# Patient Record
Sex: FEMALE | Race: BLACK OR AFRICAN AMERICAN | NOT HISPANIC OR LATINO | URBAN - METROPOLITAN AREA
[De-identification: names, ages, dates, MRNs, and addresses within clinical notes are randomized per-mention and may not be internally consistent; named-entity substitution may affect disease eponyms.]

---

## 2024-04-18 ENCOUNTER — OFFICE VISIT (OUTPATIENT)
Dept: FAMILY MEDICINE | Facility: CLINIC | Age: 29
End: 2024-04-18
Payer: MEDICAID

## 2024-04-18 VITALS
WEIGHT: 192 LBS | SYSTOLIC BLOOD PRESSURE: 115 MMHG | TEMPERATURE: 98 F | OXYGEN SATURATION: 99 % | DIASTOLIC BLOOD PRESSURE: 74 MMHG | BODY MASS INDEX: 29.1 KG/M2 | HEIGHT: 68 IN | HEART RATE: 71 BPM | RESPIRATION RATE: 18 BRPM

## 2024-04-18 DIAGNOSIS — Z00.00 ENCOUNTER FOR WELLNESS EXAMINATION: ICD-10-CM

## 2024-04-18 DIAGNOSIS — N93.9 ABNORMAL UTERINE BLEEDING: Primary | ICD-10-CM

## 2024-04-18 LAB
ALBUMIN SERPL-MCNC: 4 G/DL (ref 3.5–5)
ALBUMIN/GLOB SERPL: 1 RATIO (ref 1.1–2)
ALP SERPL-CCNC: 70 UNIT/L (ref 40–150)
ALT SERPL-CCNC: 29 UNIT/L (ref 0–55)
AMORPH URATE CRY URNS QL MICRO: ABNORMAL /UL
APPEARANCE UR: ABNORMAL
AST SERPL-CCNC: 15 UNIT/L (ref 5–34)
BACTERIA #/AREA URNS AUTO: ABNORMAL /HPF
BASOPHILS # BLD AUTO: 0.07 X10(3)/MCL
BASOPHILS NFR BLD AUTO: 1.4 %
BILIRUB SERPL-MCNC: 0.3 MG/DL
BILIRUB UR QL STRIP.AUTO: NEGATIVE
BUN SERPL-MCNC: 13.3 MG/DL (ref 7–18.7)
CALCIUM SERPL-MCNC: 9.5 MG/DL (ref 8.4–10.2)
CHLORIDE SERPL-SCNC: 107 MMOL/L (ref 98–107)
CHOLEST SERPL-MCNC: 157 MG/DL
CHOLEST/HDLC SERPL: 3 {RATIO} (ref 0–5)
CO2 SERPL-SCNC: 25 MMOL/L (ref 22–29)
COLOR UR AUTO: ABNORMAL
CREAT SERPL-MCNC: 0.84 MG/DL (ref 0.55–1.02)
DEPRECATED CALCIDIOL+CALCIFEROL SERPL-MC: 21.2 NG/ML (ref 30–80)
EOSINOPHIL # BLD AUTO: 0.06 X10(3)/MCL (ref 0–0.9)
EOSINOPHIL NFR BLD AUTO: 1.2 %
ERYTHROCYTE [DISTWIDTH] IN BLOOD BY AUTOMATED COUNT: 11.3 % (ref 11.5–17)
EST. AVERAGE GLUCOSE BLD GHB EST-MCNC: 96.8 MG/DL
GFR SERPLBLD CREATININE-BSD FMLA CKD-EPI: >60 MLS/MIN/1.73/M2
GLOBULIN SER-MCNC: 3.9 GM/DL (ref 2.4–3.5)
GLUCOSE SERPL-MCNC: 83 MG/DL (ref 74–100)
GLUCOSE UR QL STRIP.AUTO: NORMAL
HBA1C MFR BLD: 5 %
HCT VFR BLD AUTO: 39.4 % (ref 37–47)
HCV AB SERPL QL IA: NONREACTIVE
HDLC SERPL-MCNC: 56 MG/DL (ref 35–60)
HGB BLD-MCNC: 12.9 G/DL (ref 12–16)
HIV 1+2 AB+HIV1 P24 AG SERPL QL IA: NONREACTIVE
HYALINE CASTS #/AREA URNS LPF: ABNORMAL /LPF
IMM GRANULOCYTES # BLD AUTO: 0.01 X10(3)/MCL (ref 0–0.04)
IMM GRANULOCYTES NFR BLD AUTO: 0.2 %
KETONES UR QL STRIP.AUTO: NEGATIVE
LDLC SERPL CALC-MCNC: 71 MG/DL (ref 50–140)
LEUKOCYTE ESTERASE UR QL STRIP.AUTO: NEGATIVE
LYMPHOCYTES # BLD AUTO: 1.71 X10(3)/MCL (ref 0.6–4.6)
LYMPHOCYTES NFR BLD AUTO: 35.2 %
MCH RBC QN AUTO: 30.9 PG (ref 27–31)
MCHC RBC AUTO-ENTMCNC: 32.7 G/DL (ref 33–36)
MCV RBC AUTO: 94.3 FL (ref 80–94)
MONOCYTES # BLD AUTO: 0.45 X10(3)/MCL (ref 0.1–1.3)
MONOCYTES NFR BLD AUTO: 9.3 %
NEUTROPHILS # BLD AUTO: 2.56 X10(3)/MCL (ref 2.1–9.2)
NEUTROPHILS NFR BLD AUTO: 52.7 %
NITRITE UR QL STRIP.AUTO: NEGATIVE
NRBC BLD AUTO-RTO: 0 %
PH UR STRIP.AUTO: 5.5 [PH]
PLATELET # BLD AUTO: 322 X10(3)/MCL (ref 130–400)
PMV BLD AUTO: 10.1 FL (ref 7.4–10.4)
POTASSIUM SERPL-SCNC: 4 MMOL/L (ref 3.5–5.1)
PROT SERPL-MCNC: 7.9 GM/DL (ref 6.4–8.3)
PROT UR QL STRIP.AUTO: NEGATIVE
RBC # BLD AUTO: 4.18 X10(6)/MCL (ref 4.2–5.4)
RBC #/AREA URNS AUTO: ABNORMAL /HPF
RBC UR QL AUTO: NEGATIVE
SODIUM SERPL-SCNC: 139 MMOL/L (ref 136–145)
SP GR UR STRIP.AUTO: 1.03 (ref 1–1.03)
SQUAMOUS #/AREA URNS LPF: ABNORMAL /HPF
T PALLIDUM AB SER QL: NONREACTIVE
T4 FREE SERPL-MCNC: 0.77 NG/DL (ref 0.7–1.48)
TRIGL SERPL-MCNC: 150 MG/DL (ref 37–140)
TSH SERPL-ACNC: 0.69 UIU/ML (ref 0.35–4.94)
UROBILINOGEN UR STRIP-ACNC: NORMAL
VIT B12 SERPL-MCNC: 158 PG/ML (ref 213–816)
VLDLC SERPL CALC-MCNC: 30 MG/DL
WBC # SPEC AUTO: 4.86 X10(3)/MCL (ref 4.5–11.5)
WBC #/AREA URNS AUTO: ABNORMAL /HPF

## 2024-04-18 PROCEDURE — 84443 ASSAY THYROID STIM HORMONE: CPT | Performed by: NURSE PRACTITIONER

## 2024-04-18 PROCEDURE — 3078F DIAST BP <80 MM HG: CPT | Mod: CPTII,,, | Performed by: NURSE PRACTITIONER

## 2024-04-18 PROCEDURE — 84439 ASSAY OF FREE THYROXINE: CPT | Performed by: NURSE PRACTITIONER

## 2024-04-18 PROCEDURE — 99204 OFFICE O/P NEW MOD 45 MIN: CPT | Mod: S$PBB,,, | Performed by: NURSE PRACTITIONER

## 2024-04-18 PROCEDURE — 82306 VITAMIN D 25 HYDROXY: CPT | Performed by: NURSE PRACTITIONER

## 2024-04-18 PROCEDURE — 86803 HEPATITIS C AB TEST: CPT | Performed by: NURSE PRACTITIONER

## 2024-04-18 PROCEDURE — 1160F RVW MEDS BY RX/DR IN RCRD: CPT | Mod: CPTII,,, | Performed by: NURSE PRACTITIONER

## 2024-04-18 PROCEDURE — 81001 URINALYSIS AUTO W/SCOPE: CPT | Performed by: NURSE PRACTITIONER

## 2024-04-18 PROCEDURE — 83036 HEMOGLOBIN GLYCOSYLATED A1C: CPT | Performed by: NURSE PRACTITIONER

## 2024-04-18 PROCEDURE — 87389 HIV-1 AG W/HIV-1&-2 AB AG IA: CPT | Performed by: NURSE PRACTITIONER

## 2024-04-18 PROCEDURE — 82607 VITAMIN B-12: CPT | Performed by: NURSE PRACTITIONER

## 2024-04-18 PROCEDURE — 99203 OFFICE O/P NEW LOW 30 MIN: CPT | Mod: PBBFAC,PN | Performed by: NURSE PRACTITIONER

## 2024-04-18 PROCEDURE — 1159F MED LIST DOCD IN RCRD: CPT | Mod: CPTII,,, | Performed by: NURSE PRACTITIONER

## 2024-04-18 PROCEDURE — 36415 COLL VENOUS BLD VENIPUNCTURE: CPT | Performed by: NURSE PRACTITIONER

## 2024-04-18 PROCEDURE — 80053 COMPREHEN METABOLIC PANEL: CPT | Performed by: NURSE PRACTITIONER

## 2024-04-18 PROCEDURE — 80061 LIPID PANEL: CPT | Performed by: NURSE PRACTITIONER

## 2024-04-18 PROCEDURE — 85025 COMPLETE CBC W/AUTO DIFF WBC: CPT | Performed by: NURSE PRACTITIONER

## 2024-04-18 PROCEDURE — 86780 TREPONEMA PALLIDUM: CPT | Performed by: NURSE PRACTITIONER

## 2024-04-18 PROCEDURE — 3074F SYST BP LT 130 MM HG: CPT | Mod: CPTII,,, | Performed by: NURSE PRACTITIONER

## 2024-04-18 PROCEDURE — 3008F BODY MASS INDEX DOCD: CPT | Mod: CPTII,,, | Performed by: NURSE PRACTITIONER

## 2024-04-18 NOTE — PROGRESS NOTES
"Patient Name: Neha Ramirez     : 1995    MRN: [id number]     Subjective:     Patient ID: Neha Ramirez is a 28 y.o. female.    Chief Complaint:   Chief Complaint   Patient presents with    Establish Care     Pt is here to establish care with PCP. Pt was being seen by Dr Ashleigh Soliman in [place]. Pt request gyn referral for Mirena IUD removal d/t pain and heavy menses since placement        HPI: 24: Pt is here to establish care with PCP. Pt was being seen by Dr Ashleigh Soliman in [place]. Pt request gyn referral for Mirena IUD removal d/t pain and heavy menses since placement.         ROS:      Review of Systems   Constitutional: Negative.    HENT: Negative.     Eyes: Negative.    Respiratory: Negative.     Cardiovascular: Negative.    Gastrointestinal: Negative.    Genitourinary: Negative.    Musculoskeletal: Negative.    Skin: Negative.    Neurological: Negative.    Endo/Heme/Allergies: Negative.    Psychiatric/Behavioral: Negative.     All other systems reviewed and are negative.           History:     No past medical history on file.     No past surgical history on file.    Family History   Problem Relation Name Age of Onset    Hypertension Mother      No Known Problems Father          Social History     Tobacco Use    Smoking status: Never    Smokeless tobacco: Never   Substance and Sexual Activity    Alcohol use: Yes     Comment: socially    Drug use: Never    Sexual activity: Yes       No current outpatient medications     Review of patient's allergies indicates:  No Known Allergies    Objective:     Visit Vitals  /74 (BP Location: Left arm, Patient Position: Sitting, BP Method: Medium (Automatic))   Pulse 71   Temp 98.2 °F (36.8 °C) (Oral)   Resp 18   Ht 5' 8" (1.727 m)   Wt 87.1 kg (192 lb)   LMP 2024   SpO2 99%   BMI 29.19 kg/m²       Physical Examination:     Physical Exam  Constitutional:       General: She is awake.      Appearance: Normal appearance. She is well-developed. " "  HENT:      Head: Normocephalic and atraumatic.      Right Ear: Hearing, tympanic membrane, ear canal and external ear normal.      Left Ear: Hearing, tympanic membrane, ear canal and external ear normal.      Nose: Nose normal.      Mouth/Throat:      Lips: Pink.      Mouth: Mucous membranes are moist.      Pharynx: Oropharynx is clear. Uvula midline.   Eyes:      Pupils: Pupils are equal, round, and reactive to light.   Cardiovascular:      Rate and Rhythm: Normal rate and regular rhythm.      Pulses: Normal pulses.      Heart sounds: Normal heart sounds.   Pulmonary:      Effort: Pulmonary effort is normal.      Breath sounds: Normal breath sounds.   Abdominal:      General: Abdomen is flat. Bowel sounds are normal.      Palpations: Abdomen is soft.   Musculoskeletal:         General: Normal range of motion.      Cervical back: Normal range of motion and neck supple.   Skin:     General: Skin is warm and dry.      Capillary Refill: Capillary refill takes less than 2 seconds.   Neurological:      General: No focal deficit present.      Mental Status: She is alert, oriented to person, place, and time and easily aroused. Mental status is at baseline.   Psychiatric:         Mood and Affect: Mood normal.         Behavior: Behavior is cooperative.         Lab Results:     Chemistry:  No results found for: "NA", "K", "CHLORIDE", "BUN", "CREATININE", "EGFRNORACEVR", "GLUCOSE", "CALCIUM", "ALKPHOS", "LABPROT", "ALBUMIN", "BILIDIR", "IBILI", "AST", "ALT", "MG", "PHOS", "[id number]", "TSH", "[id number]", "PSA"     No results found for: "HGBA1C", "MICROALBCREA"     Hematology:  No results found for: "WBC", "HGB", "HCT", "PLT"    Lipid Panel:  No results found for: "CHOL", "HDL", "LDL", "TRIG", "TOTALCHOLEST"     Urine:  No results found for: "COLORUA", "APPEARANCEUA", "SGUA", "PHUA", "PROTEINUA", "GLUCOSEUA", "KETONESUA", "BLOODUA", "NITRITESUA", "LEUKOCYTESUR", "RBCUA", "WBCUA", "BACTERIA", "SQEPUA", "HYALINECASTS", " ""CREATRANDUR", "PROTEINURINE", "UPROTCREA"     Assessment:          ICD-10-CM ICD-9-CM   1. Abnormal uterine bleeding  N93.9 626.9   2. Encounter for wellness examination  Z00.00 V70.0        Plan:     1. Abnormal uterine bleeding  Assessment & Plan:  Referral to GYN at [facility] for removal of IUD-Mirena    Orders:  -     Ambulatory referral/consult to Gynecology; Future; Expected date: 04/25/2024    2. Encounter for wellness examination  -     Hepatitis C antibody  -     Lipid panel  -     HIV 1/2 Ag/Ab (4th Gen)  -     CBC Auto Differential  -     Comprehensive Metabolic Panel  -     Urinalysis  -     Hemoglobin A1C  -     TSH  -     T4, Free  -     Vitamin D  -     Vitamin B12  -     SYPHILIS ANTIBODY (WITH REFLEX RPR)         Follow up in about 1 month (around 5/18/2024) for Review of labs, Virtual Visit.    No future appointments.     DIGNA Yang        "

## 2024-04-23 ENCOUNTER — TELEPHONE (OUTPATIENT)
Dept: FAMILY MEDICINE | Facility: CLINIC | Age: 29
End: 2024-04-23
Payer: MEDICAID

## 2024-05-22 ENCOUNTER — OFFICE VISIT (OUTPATIENT)
Dept: FAMILY MEDICINE | Facility: CLINIC | Age: 29
End: 2024-05-22
Payer: MEDICAID

## 2024-05-22 VITALS
HEIGHT: 68 IN | SYSTOLIC BLOOD PRESSURE: 116 MMHG | DIASTOLIC BLOOD PRESSURE: 76 MMHG | BODY MASS INDEX: 28.19 KG/M2 | TEMPERATURE: 98 F | RESPIRATION RATE: 18 BRPM | OXYGEN SATURATION: 99 % | HEART RATE: 79 BPM | WEIGHT: 186 LBS

## 2024-05-22 DIAGNOSIS — E53.8 B12 DEFICIENCY: Primary | ICD-10-CM

## 2024-05-22 DIAGNOSIS — N93.9 ABNORMAL UTERINE BLEEDING: ICD-10-CM

## 2024-05-22 DIAGNOSIS — E55.9 VITAMIN D DEFICIENCY: ICD-10-CM

## 2024-05-22 LAB — FOLATE SERPL-MCNC: 11 NG/ML (ref 7–31.4)

## 2024-05-22 PROCEDURE — 1159F MED LIST DOCD IN RCRD: CPT | Mod: CPTII,,, | Performed by: NURSE PRACTITIONER

## 2024-05-22 PROCEDURE — 3008F BODY MASS INDEX DOCD: CPT | Mod: CPTII,,, | Performed by: NURSE PRACTITIONER

## 2024-05-22 PROCEDURE — 3044F HG A1C LEVEL LT 7.0%: CPT | Mod: CPTII,,, | Performed by: NURSE PRACTITIONER

## 2024-05-22 PROCEDURE — 3078F DIAST BP <80 MM HG: CPT | Mod: CPTII,,, | Performed by: NURSE PRACTITIONER

## 2024-05-22 PROCEDURE — 3074F SYST BP LT 130 MM HG: CPT | Mod: CPTII,,, | Performed by: NURSE PRACTITIONER

## 2024-05-22 PROCEDURE — 1160F RVW MEDS BY RX/DR IN RCRD: CPT | Mod: CPTII,,, | Performed by: NURSE PRACTITIONER

## 2024-05-22 PROCEDURE — 99214 OFFICE O/P EST MOD 30 MIN: CPT | Mod: S$PBB,,, | Performed by: NURSE PRACTITIONER

## 2024-05-22 PROCEDURE — 83921 ORGANIC ACID SINGLE QUANT: CPT | Performed by: NURSE PRACTITIONER

## 2024-05-22 PROCEDURE — 82746 ASSAY OF FOLIC ACID SERUM: CPT | Performed by: NURSE PRACTITIONER

## 2024-05-22 PROCEDURE — 36415 COLL VENOUS BLD VENIPUNCTURE: CPT | Performed by: NURSE PRACTITIONER

## 2024-05-22 PROCEDURE — 99214 OFFICE O/P EST MOD 30 MIN: CPT | Mod: PBBFAC,PN | Performed by: NURSE PRACTITIONER

## 2024-05-22 RX ORDER — LANOLIN ALCOHOL/MO/W.PET/CERES
1000 CREAM (GRAM) TOPICAL DAILY
Qty: 30 TABLET | Refills: 6 | Status: SHIPPED | OUTPATIENT
Start: 2024-05-22

## 2024-05-22 RX ORDER — ASPIRIN 325 MG
50000 TABLET, DELAYED RELEASE (ENTERIC COATED) ORAL
Qty: 12 CAPSULE | Refills: 0 | Status: SHIPPED | OUTPATIENT
Start: 2024-05-22

## 2024-05-22 NOTE — ASSESSMENT & PLAN NOTE
Initiate Oral B12 1000mcg daily  Vitamin Def panel with Folate, MMA, .Homocysteine  Pt states since IUD was placed, she has been having gastritis type symptoms of reflux, etc.  Denies hx gluten intolerance.  May consider celiac workup if sx continue despite removal of IUD in June.

## 2024-05-22 NOTE — PROGRESS NOTES
Patient Name: Neha Ramirez     : 1995    MRN: [id number]     Subjective:     Patient ID: Neha Ramirez is a 28 y.o. female.    Chief Complaint:   Chief Complaint   Patient presents with    Follow-up     Pt is here for follow up. Pt sees gyn 2024        HPI: 24: FU 1 month review of labs which showed b12 and vit d def.  Patient educated during visit on B12 deficiencies. Does report some GERD or gastritis type symptoms since the IUD was placed.  She is going to see GYN in  to have visit and then will be scheduled for removal.           24: Pt is here to establish care with PCP. Pt was being seen by Dr Ashleigh Soliman in [place]. Pt request gyn referral for Mirena IUD removal d/t pain and heavy menses since placement.             ROS:      Review of Systems   Constitutional: Negative.    HENT: Negative.     Eyes: Negative.    Respiratory: Negative.     Cardiovascular: Negative.    Gastrointestinal: Negative.    Genitourinary: Negative.    Musculoskeletal: Negative.    Skin: Negative.    Neurological: Negative.    Endo/Heme/Allergies: Negative.    Psychiatric/Behavioral: Negative.     All other systems reviewed and are negative.           History:     History reviewed. No pertinent past medical history.     History reviewed. No pertinent surgical history.    Family History   Problem Relation Name Age of Onset    Hypertension Mother      No Known Problems Father          Social History     Tobacco Use    Smoking status: Never    Smokeless tobacco: Never   Substance and Sexual Activity    Alcohol use: Yes     Comment: socially    Drug use: Never    Sexual activity: Yes       Current Outpatient Medications   Medication Instructions    cholecalciferol (vitamin D3) 50,000 Units, Oral, Every 7 days    cyanocobalamin (VITAMIN B-12) 1,000 mcg, Oral, Daily        Review of patient's allergies indicates:  No Known Allergies    Objective:     Visit Vitals  /76 (BP Location: Left arm, Patient  "Position: Sitting, BP Method: Large (Automatic))   Pulse 79   Temp 97.9 °F (36.6 °C) (Oral)   Resp 18   Ht 5' 8" (1.727 m)   Wt 84.4 kg (186 lb)   LMP 04/26/2024 (Approximate)   SpO2 99%   BMI 28.28 kg/m²       Physical Examination:     Physical Exam  Vitals and nursing note reviewed.   HENT:      Head: Normocephalic and atraumatic.   Cardiovascular:      Rate and Rhythm: Normal rate and regular rhythm.      Pulses: Normal pulses.      Heart sounds: Normal heart sounds.   Pulmonary:      Breath sounds: Normal breath sounds.   Musculoskeletal:         General: Normal range of motion.      Cervical back: Normal range of motion.   Skin:     General: Skin is warm and dry.   Neurological:      General: No focal deficit present.      Mental Status: She is alert and oriented to person, place, and time.   Psychiatric:         Mood and Affect: Mood normal.         Lab Results:     Chemistry:  Lab Results   Component Value Date     04/18/2024    K 4.0 04/18/2024    CHLORIDE 107 04/18/2024    BUN 13.3 04/18/2024    CREATININE 0.84 04/18/2024    EGFRNORACEVR >60 04/18/2024    GLUCOSE 83 04/18/2024    CALCIUM 9.5 04/18/2024    ALKPHOS 70 04/18/2024    LABPROT 7.9 04/18/2024    ALBUMIN 4.0 04/18/2024    AST 15 04/18/2024    ALT 29 04/18/2024    TXPIDRYP89QH 21.2 (L) 04/18/2024    TSH 0.685 04/18/2024    VVKXTO2MFDC 0.77 04/18/2024        Lab Results   Component Value Date    HGBA1C 5.0 04/18/2024        Hematology:  Lab Results   Component Value Date    WBC 4.86 04/18/2024    HGB 12.9 04/18/2024    HCT 39.4 04/18/2024     04/18/2024       Lipid Panel:  Lab Results   Component Value Date    CHOL 157 04/18/2024    HDL 56 04/18/2024    LDL 71.00 04/18/2024    TRIG 150 (H) 04/18/2024    TOTALCHOLEST 3 04/18/2024        Urine:  Lab Results   Component Value Date    COLORUA Light-Orange (A) 04/18/2024    APPEARANCEUA Cloudy (A) 04/18/2024    SGUA 1.028 04/18/2024    PHUA 5.5 04/18/2024    PROTEINUA Negative 04/18/2024    " GLUCOSEUA Normal 04/18/2024    KETONESUA Negative 04/18/2024    BLOODUA Negative 04/18/2024    NITRITESUA Negative 04/18/2024    LEUKOCYTESUR Negative 04/18/2024    RBCUA 0-5 04/18/2024    WBCUA 0-5 04/18/2024    BACTERIA Rare 04/18/2024    SQEPUA Few 04/18/2024    HYALINECASTS None Seen 04/18/2024        Assessment:          ICD-10-CM ICD-9-CM   1. B12 deficiency  E53.8 266.2   2. Vitamin D deficiency  E55.9 268.9   3. Abnormal uterine bleeding  N93.9 626.9        Plan:     1. B12 deficiency  Assessment & Plan:  Initiate Oral B12 1000mcg daily  Vitamin Def panel with Folate, MMA, .Homocysteine  Pt states since IUD was placed, she has been having gastritis type symptoms of reflux, etc.  Denies hx gluten intolerance.  May consider celiac workup if sx continue despite removal of IUD in June.     Orders:  -     Folate  -     Homocysteine, Serum  -     Methylmalonic Acid, Serum  -     cyanocobalamin (VITAMIN B-12) 1000 MCG tablet; Take 1 tablet (1,000 mcg total) by mouth once daily.  Dispense: 30 tablet; Refill: 6    2. Vitamin D deficiency  Assessment & Plan:  Educated on increasing foods high in Vitamin D such as fish oil, cod liver oil, salmon, milk fortified with vitamin D.  RX Vitamin D3 25052 IU weekly x 12 weeks.  Complete entire 12 weeks of Vitamin D prescription.  After completion of prescription (12 weeks/3 months), begin taking Vitamin D 2000 I.U. tablets daily (purchase over the counter).  Repeat Vitamin D level as ordered.      Orders:  -     cholecalciferol, vitamin D3, 1,250 mcg (50,000 unit) capsule; Take 1 capsule (50,000 Units total) by mouth every 7 days.  Dispense: 12 capsule; Refill: 0    3. Abnormal uterine bleeding  Assessment & Plan:  Pt has upcoming GYN appointment           Follow up in about 4 months (around 9/22/2024) for b12 deficiency repeat bloodwork, vit d def.    Future Appointments   Date Time Provider Department Center   9/23/2024  9:15 AM [facility]         Margret Johnson, VANNESAP

## 2024-05-22 NOTE — ASSESSMENT & PLAN NOTE
Educated on increasing foods high in Vitamin D such as fish oil, cod liver oil, salmon, milk fortified with vitamin D.  RX Vitamin D3 60652 IU weekly x 12 weeks.  Complete entire 12 weeks of Vitamin D prescription.  After completion of prescription (12 weeks/3 months), begin taking Vitamin D 2000 I.U. tablets daily (purchase over the counter).  Repeat Vitamin D level as ordered.

## 2024-05-22 NOTE — PROGRESS NOTES
I have sent medications and/or lab orders in for this patient.  Please notify the patient.     Orders Placed This Encounter   Procedures    Homocysteine, Serum     Standing Status:   Future     Standing Expiration Date:   8/20/2025

## 2024-05-28 LAB — METHYLMALONATE SERPL-SCNC: 0.06 NMOL/ML

## 2024-06-25 LAB
HPV APTIMA: NEGATIVE
PAP RECOMMENDATION EXT: NORMAL

## 2024-10-02 ENCOUNTER — PATIENT OUTREACH (OUTPATIENT)
Dept: ADMINISTRATIVE | Facility: HOSPITAL | Age: 29
End: 2024-10-02
Payer: MEDICAID

## 2024-12-31 ENCOUNTER — OFFICE VISIT (OUTPATIENT)
Dept: FAMILY MEDICINE | Facility: CLINIC | Age: 29
End: 2024-12-31
Payer: MEDICAID

## 2024-12-31 ENCOUNTER — HOSPITAL ENCOUNTER (OUTPATIENT)
Dept: RADIOLOGY | Facility: HOSPITAL | Age: 29
Discharge: HOME | End: 2024-12-31
Attending: NURSE PRACTITIONER
Payer: MEDICAID

## 2024-12-31 VITALS
HEART RATE: 75 BPM | SYSTOLIC BLOOD PRESSURE: 126 MMHG | RESPIRATION RATE: 18 BRPM | HEIGHT: 68 IN | OXYGEN SATURATION: 98 % | BODY MASS INDEX: 29.4 KG/M2 | DIASTOLIC BLOOD PRESSURE: 77 MMHG | WEIGHT: 194 LBS | TEMPERATURE: 98 F

## 2024-12-31 DIAGNOSIS — E55.9 VITAMIN D DEFICIENCY: ICD-10-CM

## 2024-12-31 DIAGNOSIS — R10.9 STOMACH PAIN: Primary | ICD-10-CM

## 2024-12-31 DIAGNOSIS — E53.8 B12 DEFICIENCY: ICD-10-CM

## 2024-12-31 PROBLEM — N93.9 ABNORMAL UTERINE BLEEDING: Status: RESOLVED | Noted: 2024-04-18 | Resolved: 2024-12-31

## 2024-12-31 LAB
B-HCG UR QL: NEGATIVE
CTP QC/QA: YES

## 2024-12-31 PROCEDURE — 81025 URINE PREGNANCY TEST: CPT | Mod: PBBFAC,PN | Performed by: NURSE PRACTITIONER

## 2024-12-31 PROCEDURE — 99213 OFFICE O/P EST LOW 20 MIN: CPT | Mod: PBBFAC,PN,25 | Performed by: NURSE PRACTITIONER

## 2024-12-31 PROCEDURE — 1160F RVW MEDS BY RX/DR IN RCRD: CPT | Mod: CPTII,,, | Performed by: NURSE PRACTITIONER

## 2024-12-31 PROCEDURE — 3044F HG A1C LEVEL LT 7.0%: CPT | Mod: CPTII,,, | Performed by: NURSE PRACTITIONER

## 2024-12-31 PROCEDURE — 3008F BODY MASS INDEX DOCD: CPT | Mod: CPTII,,, | Performed by: NURSE PRACTITIONER

## 2024-12-31 PROCEDURE — 1159F MED LIST DOCD IN RCRD: CPT | Mod: CPTII,,, | Performed by: NURSE PRACTITIONER

## 2024-12-31 PROCEDURE — 99214 OFFICE O/P EST MOD 30 MIN: CPT | Mod: S$PBB,,, | Performed by: NURSE PRACTITIONER

## 2024-12-31 PROCEDURE — 3078F DIAST BP <80 MM HG: CPT | Mod: CPTII,,, | Performed by: NURSE PRACTITIONER

## 2024-12-31 PROCEDURE — 74019 RADEX ABDOMEN 2 VIEWS: CPT | Mod: TC,PN

## 2024-12-31 PROCEDURE — 3074F SYST BP LT 130 MM HG: CPT | Mod: CPTII,,, | Performed by: NURSE PRACTITIONER

## 2024-12-31 NOTE — ASSESSMENT & PLAN NOTE
Educated on increasing foods high in Vitamin D such as fish oil, cod liver oil, salmon, milk fortified with vitamin D.  RX Vitamin D3 25639 IU weekly x 12 weeks.  Complete entire 12 weeks of Vitamin D prescription.  After completion of prescription (12 weeks/3 months), begin taking Vitamin D 2000 I.U. tablets daily (purchase over the counter).  Repeat Vitamin D level as ordered.

## 2024-12-31 NOTE — PROGRESS NOTES
Patient Name: Neha Ramirez     : 1995    MRN: [id number]     Subjective:     Patient ID: Neha Ramirez is a 29 y.o. female.    Chief Complaint:   Chief Complaint   Patient presents with    Follow-up     Pt is here with c/o lower back and stomach pain. Pt was on Depo-provera injections but discontinued        HPI: 24: Pt is here with c/o lower back and stomach pain. Pt was on Depo-provera injections but discontinued.  was last Depo injection.  Pt made appointment 24 for this issue which is onset of pain.  UPT in clinic is negative.  States pain in her abdomen is generalized around umbilicus.  She does not think this feels like menstrual cramping. She has been told in the past that she had ovarian cysts.  She does not agree with this dx.  Pain does radiate into her back but is non-tender on exam.         24: FU 1 month review of labs which showed b12 and vit d def.  Patient educated during visit on B12 deficiencies. Does report some GERD or gastritis type symptoms since the IUD was placed.  She is going to see GYN in  to have visit and then will be scheduled for removal.              24: Pt is here to establish care with PCP. Pt was being seen by Dr Ashleigh Soliman in [place]. Pt request gyn referral for Mirena IUD removal d/t pain and heavy menses since placement          ROS:      Review of Systems   Gastrointestinal:  Positive for abdominal pain.   Musculoskeletal:  Positive for back pain.   All other systems reviewed and are negative.           History:     Past Medical History:   Diagnosis Date    Abnormal uterine bleeding 2024        History reviewed. No pertinent surgical history.    Family History   Problem Relation Name Age of Onset    Hypertension Mother      No Known Problems Father          Social History     Tobacco Use    Smoking status: Never    Smokeless tobacco: Never   Substance and Sexual Activity    Alcohol use: Yes     Comment: socially    Drug use:  "Never    Sexual activity: Yes       Current Outpatient Medications   Medication Instructions    cyanocobalamin (VITAMIN B-12) 1,000 mcg, Oral, Daily        Review of patient's allergies indicates:  No Known Allergies    Objective:     Visit Vitals  /77 (BP Location: Left arm, Patient Position: Sitting)   Pulse 75   Temp 98 °F (36.7 °C) (Oral)   Resp 18   Ht 5' 8" (1.727 m)   Wt 88 kg (194 lb)   SpO2 98%   BMI 29.50 kg/m²       Physical Examination:     Physical Exam  Vitals reviewed.   Constitutional:       Appearance: Normal appearance. She is normal weight.   HENT:      Head: Normocephalic.   Cardiovascular:      Rate and Rhythm: Normal rate and regular rhythm.      Pulses: Normal pulses.      Heart sounds: Normal heart sounds.   Pulmonary:      Effort: Pulmonary effort is normal.      Breath sounds: Normal breath sounds.   Abdominal:      General: Abdomen is flat. Bowel sounds are normal.      Palpations: Abdomen is soft.      Tenderness: There is abdominal tenderness in the periumbilical area and suprapubic area. There is no right CVA tenderness, left CVA tenderness, guarding or rebound. Negative signs include Jones's sign, Rovsing's sign, McBurney's sign, psoas sign and obturator sign.      Hernia: No hernia is present.   Musculoskeletal:         General: Normal range of motion.      Cervical back: Normal range of motion.   Skin:     General: Skin is warm and dry.   Neurological:      Mental Status: She is alert.   Psychiatric:         Mood and Affect: Mood normal.         Lab Results:     Chemistry:  Lab Results   Component Value Date     04/18/2024    K 4.0 04/18/2024    BUN 13.3 04/18/2024    CREATININE 0.84 04/18/2024    EGFRNORACEVR >60 04/18/2024    GLUCOSE 83 04/18/2024    CALCIUM 9.5 04/18/2024    ALKPHOS 70 04/18/2024    LABPROT 7.9 04/18/2024    ALBUMIN 4.0 04/18/2024    AST 15 04/18/2024    ALT 29 04/18/2024    [id number] 21.2 (L) 04/18/2024    TSH 0.685 04/18/2024    FJVPFK8OYKR 0.77 " 04/18/2024        Lab Results   Component Value Date    HGBA1C 5.0 04/18/2024        Hematology:  Lab Results   Component Value Date    WBC 4.86 04/18/2024    HGB 12.9 04/18/2024    HCT 39.4 04/18/2024     04/18/2024       Lipid Panel:  Lab Results   Component Value Date    CHOL 157 04/18/2024    HDL 56 04/18/2024    LDL 71.00 04/18/2024    TRIG 150 (H) 04/18/2024    TOTALCHOLEST 3 04/18/2024        Urine:  Lab Results   Component Value Date    APPEARANCEUA Cloudy (A) 04/18/2024    SGUA 1.028 04/18/2024    PROTEINUA Negative 04/18/2024    KETONESUA Negative 04/18/2024    LEUKOCYTESUR Negative 04/18/2024    RBCUA 0-5 04/18/2024    WBCUA 0-5 04/18/2024    BACTERIA Rare 04/18/2024    SQEPUA Few 04/18/2024    HYALINECASTS None Seen 04/18/2024        Assessment:          ICD-10-CM ICD-9-CM   1. Stomach pain  R10.9 536.8   2. B12 deficiency  E53.8 266.2   3. Vitamin D deficiency  E55.9 268.9        Plan:     1. Stomach pain  Assessment & Plan:  UPT negative in clinic. Last Depo in June. Pt declines further contraception options. IUD removed by LEWIS Woodall.   UA, Xray abdomen, chlamydia/GC today    Orders:  -     POCT Urine Pregnancy  -     Urinalysis, Reflex to Urine Culture  -     Chlamydia/GC, PCR  -     X-Ray Abdomen Flat And Erect  -     Chlamydia/GC, PCR; Future; Expected date: 12/31/2024  -     Urinalysis, Reflex to Urine Culture; Future; Expected date: 12/31/2024    2. B12 deficiency  Assessment & Plan:  Pt stopped taking B12 orally.   B12 ordered today to go to lab and get this done    Orders:  -     Vitamin B12; Future; Expected date: 12/31/2024    3. Vitamin D deficiency  Assessment & Plan:  Educated on increasing foods high in Vitamin D such as fish oil, cod liver oil, salmon, milk fortified with vitamin D.  RX Vitamin D3 27815 IU weekly x 12 weeks.  Complete entire 12 weeks of Vitamin D prescription.  After completion of prescription (12 weeks/3 months), begin taking Vitamin D 2000 I.U. tablets daily  (purchase over the counter).  Repeat Vitamin D level as ordered.             Follow up in about 4 months (around 4/30/2025), or if symptoms worsen or fail to improve, for Wellness..  In addition to their scheduled follow up, the patient has also been instructed to follow up on as needed basis.         No future appointments.     Margret Johnson, DIGNA

## 2024-12-31 NOTE — ASSESSMENT & PLAN NOTE
UPT negative in clinic. Last Depo in June. Pt declines further contraception options. IUD removed by LEWIS Woodall.   UA, Xray abdomen, chlamydia/GC today

## 2025-06-09 ENCOUNTER — OFFICE VISIT (OUTPATIENT)
Dept: FAMILY MEDICINE | Facility: CLINIC | Age: 30
End: 2025-06-09
Payer: MEDICAID

## 2025-06-09 ENCOUNTER — TELEPHONE (OUTPATIENT)
Dept: FAMILY MEDICINE | Facility: CLINIC | Age: 30
End: 2025-06-09

## 2025-06-09 VITALS
HEART RATE: 71 BPM | BODY MASS INDEX: 30.62 KG/M2 | DIASTOLIC BLOOD PRESSURE: 81 MMHG | RESPIRATION RATE: 18 BRPM | WEIGHT: 202 LBS | SYSTOLIC BLOOD PRESSURE: 126 MMHG | TEMPERATURE: 98 F | OXYGEN SATURATION: 100 % | HEIGHT: 68 IN

## 2025-06-09 DIAGNOSIS — E55.9 VITAMIN D DEFICIENCY: ICD-10-CM

## 2025-06-09 DIAGNOSIS — E53.8 B12 DEFICIENCY: ICD-10-CM

## 2025-06-09 DIAGNOSIS — Z00.00 ENCOUNTER FOR WELLNESS EXAMINATION: Primary | ICD-10-CM

## 2025-06-09 PROBLEM — R10.9 STOMACH PAIN: Status: RESOLVED | Noted: 2024-12-31 | Resolved: 2025-06-09

## 2025-06-09 LAB
25(OH)D3+25(OH)D2 SERPL-MCNC: 39 NG/ML (ref 30–80)
ALBUMIN SERPL-MCNC: 3.6 G/DL (ref 3.5–5)
ALBUMIN/GLOB SERPL: 1 RATIO (ref 1.1–2)
ALP SERPL-CCNC: 60 UNIT/L (ref 40–150)
ALT SERPL-CCNC: 9 UNIT/L (ref 0–55)
ANION GAP SERPL CALC-SCNC: 7 MEQ/L
AST SERPL-CCNC: 12 UNIT/L (ref 11–45)
B-HCG FREE SERPL-ACNC: 25.41 MIU/ML
B-HCG UR QL: NEGATIVE
BACTERIA #/AREA URNS AUTO: ABNORMAL /HPF
BASOPHILS # BLD AUTO: 0.05 X10(3)/MCL
BASOPHILS NFR BLD AUTO: 1.1 %
BILIRUB SERPL-MCNC: 0.3 MG/DL
BILIRUB UR QL STRIP.AUTO: NEGATIVE
BUN SERPL-MCNC: 7.6 MG/DL (ref 7–18.7)
CALCIUM SERPL-MCNC: 9.1 MG/DL (ref 8.4–10.2)
CHLORIDE SERPL-SCNC: 108 MMOL/L (ref 98–107)
CHOLEST SERPL-MCNC: 141 MG/DL
CHOLEST/HDLC SERPL: 3 {RATIO} (ref 0–5)
CLARITY UR: CLEAR
CO2 SERPL-SCNC: 25 MMOL/L (ref 22–29)
COLOR UR AUTO: ABNORMAL
CREAT SERPL-MCNC: 0.67 MG/DL (ref 0.55–1.02)
CREAT/UREA NIT SERPL: 11
CTP QC/QA: YES
EOSINOPHIL # BLD AUTO: 0.04 X10(3)/MCL (ref 0–0.9)
EOSINOPHIL NFR BLD AUTO: 0.9 %
ERYTHROCYTE [DISTWIDTH] IN BLOOD BY AUTOMATED COUNT: 11.4 % (ref 11.5–17)
EST. AVERAGE GLUCOSE BLD GHB EST-MCNC: 96.8 MG/DL
GFR SERPLBLD CREATININE-BSD FMLA CKD-EPI: >60 ML/MIN/1.73/M2
GLOBULIN SER-MCNC: 3.7 GM/DL (ref 2.4–3.5)
GLUCOSE SERPL-MCNC: 81 MG/DL (ref 74–100)
GLUCOSE UR QL STRIP: NORMAL
HAV IGM SERPL QL IA: NONREACTIVE
HBA1C MFR BLD: 5 %
HBV CORE IGM SERPL QL IA: NONREACTIVE
HBV SURFACE AG SERPL QL IA: NONREACTIVE
HCT VFR BLD AUTO: 36.4 % (ref 37–47)
HCV AB SERPL QL IA: NONREACTIVE
HDLC SERPL-MCNC: 53 MG/DL (ref 35–60)
HGB BLD-MCNC: 11.8 G/DL (ref 12–16)
HGB UR QL STRIP: NEGATIVE
HIV 1+2 AB+HIV1 P24 AG SERPL QL IA: NONREACTIVE
HYALINE CASTS #/AREA URNS LPF: ABNORMAL /LPF
IMM GRANULOCYTES # BLD AUTO: 0 X10(3)/MCL (ref 0–0.04)
IMM GRANULOCYTES NFR BLD AUTO: 0 %
KETONES UR QL STRIP: NEGATIVE
LDLC SERPL CALC-MCNC: 79 MG/DL (ref 50–140)
LEUKOCYTE ESTERASE UR QL STRIP: NEGATIVE
LYMPHOCYTES # BLD AUTO: 1.66 X10(3)/MCL (ref 0.6–4.6)
LYMPHOCYTES NFR BLD AUTO: 36.6 %
MCH RBC QN AUTO: 30.5 PG (ref 27–31)
MCHC RBC AUTO-ENTMCNC: 32.4 G/DL (ref 33–36)
MCV RBC AUTO: 94.1 FL (ref 80–94)
MONOCYTES # BLD AUTO: 0.47 X10(3)/MCL (ref 0.1–1.3)
MONOCYTES NFR BLD AUTO: 10.4 %
MUCOUS THREADS URNS QL MICRO: ABNORMAL /LPF
NEUTROPHILS # BLD AUTO: 2.32 X10(3)/MCL (ref 2.1–9.2)
NEUTROPHILS NFR BLD AUTO: 51 %
NITRITE UR QL STRIP: NEGATIVE
NRBC BLD AUTO-RTO: 0 %
PH UR STRIP: 5.5 [PH]
PLATELET # BLD AUTO: 357 X10(3)/MCL (ref 130–400)
PMV BLD AUTO: 9.8 FL (ref 7.4–10.4)
POTASSIUM SERPL-SCNC: 3.8 MMOL/L (ref 3.5–5.1)
PROT SERPL-MCNC: 7.3 GM/DL (ref 6.4–8.3)
PROT UR QL STRIP: NEGATIVE
RBC # BLD AUTO: 3.87 X10(6)/MCL (ref 4.2–5.4)
RBC #/AREA URNS AUTO: ABNORMAL /HPF
SODIUM SERPL-SCNC: 140 MMOL/L (ref 136–145)
SP GR UR STRIP.AUTO: 1.02 (ref 1–1.03)
SQUAMOUS #/AREA URNS LPF: ABNORMAL /HPF
T PALLIDUM AB SER QL: NONREACTIVE
T4 FREE SERPL-MCNC: 0.88 NG/DL (ref 0.7–1.48)
TRIGL SERPL-MCNC: 47 MG/DL (ref 37–140)
TSH SERPL-ACNC: 0.82 UIU/ML (ref 0.35–4.94)
UROBILINOGEN UR STRIP-ACNC: NORMAL
VIT B12 SERPL-MCNC: 244 PG/ML (ref 213–816)
VLDLC SERPL CALC-MCNC: 9 MG/DL
WBC # BLD AUTO: 4.54 X10(3)/MCL (ref 4.5–11.5)
WBC #/AREA URNS AUTO: ABNORMAL /HPF

## 2025-06-09 PROCEDURE — 81001 URINALYSIS AUTO W/SCOPE: CPT | Performed by: NURSE PRACTITIONER

## 2025-06-09 PROCEDURE — 84439 ASSAY OF FREE THYROXINE: CPT | Performed by: NURSE PRACTITIONER

## 2025-06-09 PROCEDURE — 83036 HEMOGLOBIN GLYCOSYLATED A1C: CPT | Performed by: NURSE PRACTITIONER

## 2025-06-09 PROCEDURE — 80061 LIPID PANEL: CPT | Performed by: NURSE PRACTITIONER

## 2025-06-09 PROCEDURE — 99213 OFFICE O/P EST LOW 20 MIN: CPT | Mod: PBBFAC,PN | Performed by: NURSE PRACTITIONER

## 2025-06-09 PROCEDURE — 80074 ACUTE HEPATITIS PANEL: CPT | Performed by: NURSE PRACTITIONER

## 2025-06-09 PROCEDURE — 86780 TREPONEMA PALLIDUM: CPT | Performed by: NURSE PRACTITIONER

## 2025-06-09 PROCEDURE — 80053 COMPREHEN METABOLIC PANEL: CPT | Performed by: NURSE PRACTITIONER

## 2025-06-09 PROCEDURE — 84702 CHORIONIC GONADOTROPIN TEST: CPT | Performed by: NURSE PRACTITIONER

## 2025-06-09 PROCEDURE — 87389 HIV-1 AG W/HIV-1&-2 AB AG IA: CPT | Performed by: NURSE PRACTITIONER

## 2025-06-09 PROCEDURE — 82306 VITAMIN D 25 HYDROXY: CPT | Performed by: NURSE PRACTITIONER

## 2025-06-09 PROCEDURE — 84443 ASSAY THYROID STIM HORMONE: CPT | Performed by: NURSE PRACTITIONER

## 2025-06-09 PROCEDURE — 85025 COMPLETE CBC W/AUTO DIFF WBC: CPT | Performed by: NURSE PRACTITIONER

## 2025-06-09 PROCEDURE — 82607 VITAMIN B-12: CPT | Performed by: NURSE PRACTITIONER

## 2025-06-09 PROCEDURE — 81025 URINE PREGNANCY TEST: CPT | Mod: PBBFAC,PN | Performed by: NURSE PRACTITIONER

## 2025-06-09 RX ORDER — LANOLIN ALCOHOL/MO/W.PET/CERES
1000 CREAM (GRAM) TOPICAL DAILY
Qty: 30 TABLET | Refills: 6 | Status: SHIPPED | OUTPATIENT
Start: 2025-06-09

## 2025-06-09 NOTE — PROGRESS NOTES
[facility]      Patient Name: Neha Ramirez     : 1995    MRN: [id number]     Subjective:     Patient ID: Neha Ramirez is a 30 y.o. female.    Chief Complaint:   Chief Complaint   Patient presents with    Follow-up     Pt is here with reports of (+)UPT and vaginal spotting. Pt seen at [facility] for eval/tx. . Pt UPT(-) today        HPI: 25:  Annual wellness labs and ER Follow-up from recent ER visit at [facility].  She reports (+)UPT and vaginal spotting. Pt seen at [facility] for eval/tx. UPT in clinic today is negative initially but once it sat for a while it turned faintly positive.  A quantitative hcg was ordered.    Stopped B12 oral meds due to lack of refills.  Has taken all of her vitamin D supplementation. Denies other issues.  IUD was removed earlier this year by GYN (Jose C). She has upcoming appointment for wellness with GYN.     24: Pt is here with c/o lower back and stomach pain. Pt was on Depo-provera injections but discontinued.  was last Depo injection.  Pt made appointment 24 for this issue which is onset of pain.  UPT in clinic is negative.  States pain in her abdomen is generalized around umbilicus.  She does not think this feels like menstrual cramping. She has been told in the past that she had ovarian cysts.  She does not agree with this dx.  Pain does radiate into her back but is non-tender on exam.           24: FU 1 month review of labs which showed b12 and vit d def.  Patient educated during visit on B12 deficiencies. Does report some GERD or gastritis type symptoms since the IUD was placed.  She is going to see GYN in  to have visit and then will be scheduled for removal.              24: Pt is here to establish care with PCP. Pt was being seen by Dr Ashleigh Soliman in [place]. Pt request gyn referral for Mirena IUD removal d/t pain and heavy menses since placement          ROS:  "     Review of Systems   Constitutional: Negative.    HENT: Negative.     Eyes: Negative.    Respiratory: Negative.     Cardiovascular: Negative.    Gastrointestinal: Negative.    Genitourinary: Negative.    Musculoskeletal: Negative.    Skin: Negative.    Neurological: Negative.    Endo/Heme/Allergies: Negative.    Psychiatric/Behavioral: Negative.     All other systems reviewed and are negative.      12 point review of systems conducted, negative except as stated in the history of present illness. See HPI for details.    History:     Health Maintenance         Date Due Completion Date    Influenza Vaccine (Season Ended) 09/01/2025 4/18/2024 (Declined)    Override on 4/18/2024: Declined    Cervical Cancer Screening 06/25/2029 6/25/2024    TETANUS VACCINE 08/11/2029 8/11/2019    RSV Vaccine (Age 60+ and Pregnant patients) (1 - 1-dose 75+ series) 06/02/2068 ---            Past Medical History:   Diagnosis Date    Abnormal uterine bleeding 04/18/2024    Stomach pain 12/31/2024        History reviewed. No pertinent surgical history.    Family History   Problem Relation Name Age of Onset    Hypertension Mother      No Known Problems Father          Social History     Tobacco Use    Smoking status: Never    Smokeless tobacco: Never   Substance and Sexual Activity    Alcohol use: Yes     Comment: socially    Drug use: Never    Sexual activity: Yes       Current Outpatient Medications   Medication Instructions    cyanocobalamin (VITAMIN B-12) 1,000 mcg, Oral, Daily        Review of patient's allergies indicates:  No Known Allergies    Patient Care Team:  Margret Johnson FNP as PCP - General (Nurse Practitioner)    Objective:     Visit Vitals  /81   Pulse 71   Temp 97.5 °F (36.4 °C) (Oral)   Resp 18   Ht 5' 8" (1.727 m)   Wt 91.6 kg (202 lb)   SpO2 100%   BMI 30.71 kg/m²       Physical Examination:     Physical Exam  Vitals reviewed.   Constitutional:       Appearance: Normal appearance. She is normal weight.   HENT: "      Head: Normocephalic.   Cardiovascular:      Rate and Rhythm: Normal rate and regular rhythm.      Pulses: Normal pulses.      Heart sounds: Normal heart sounds.   Pulmonary:      Effort: Pulmonary effort is normal.      Breath sounds: Normal breath sounds.   Abdominal:      General: Abdomen is flat.      Palpations: Abdomen is soft.   Musculoskeletal:         General: Normal range of motion.      Cervical back: Normal range of motion.   Skin:     General: Skin is warm and dry.   Neurological:      Mental Status: She is alert.   Psychiatric:         Mood and Affect: Mood normal.         Lab Results:     Chemistry:  Lab Results   Component Value Date     04/18/2024    K 4.0 04/18/2024    BUN 13.3 04/18/2024    CREATININE 0.84 04/18/2024    EGFRNORACEVR >60 04/18/2024    CALCIUM 9.5 04/18/2024    ALKPHOS 70 04/18/2024    ALBUMIN 4.0 04/18/2024    AST 15 04/18/2024    ALT 29 04/18/2024    WBEPSFWD39BP 21.2 (L) 04/18/2024    TSH 0.685 04/18/2024    VSEYMD8NZHY 0.77 04/18/2024        Lab Results   Component Value Date    HGBA1C 5.0 04/18/2024        Hematology:  Lab Results   Component Value Date    WBC 4.86 04/18/2024    HGB 12.9 04/18/2024    HCT 39.4 04/18/2024     04/18/2024       Lipid Panel:  Lab Results   Component Value Date    CHOL 157 04/18/2024    HDL 56 04/18/2024    LDL 71.00 04/18/2024    TRIG 150 (H) 04/18/2024    TOTALCHOLEST 3 04/18/2024        Urine:  Lab Results   Component Value Date    APPEARANCEUA Cloudy (A) 04/18/2024    SGUA 1.028 04/18/2024    PROTEINUA Negative 04/18/2024    KETONESUA Negative 04/18/2024    LEUKOCYTESUR Negative 04/18/2024    RBCUA 0-5 04/18/2024    WBCUA 0-5 04/18/2024    BACTERIA Rare 04/18/2024    SQEPUA Few 04/18/2024    HYALINECASTS None Seen 04/18/2024        Assessment:          ICD-10-CM ICD-9-CM   1. Encounter for wellness examination  Z00.00 V70.0   2. B12 deficiency  E53.8 266.2   3. Vitamin D deficiency  E55.9 268.9          Plan:     1. Encounter  for wellness examination  -     CBC Auto Differential  -     Comprehensive Metabolic Panel  -     Urinalysis  -     Hemoglobin A1C  -     TSH  -     T4, Free  -     Vitamin D  -     Vitamin B12  -     Hepatitis Panel, Acute  -     HIV 1/2 Ag/Ab (4th Gen)  -     SYPHILIS ANTIBODY (WITH REFLEX RPR)  -     Lipid Panel  -     POCT Urine Pregnancy  -     HCG, Quantitative    2. B12 deficiency  Assessment & Plan:  Pt stopped taking B12 orally due to lack of refills.  B12 ordered today     Orders:  -     cyanocobalamin (VITAMIN B-12) 1000 MCG tablet; Take 1 tablet (1,000 mcg total) by mouth once daily.  Dispense: 30 tablet; Refill: 6    3. Vitamin D deficiency  Assessment & Plan:  Educated on increasing foods high in Vitamin D such as fish oil, cod liver oil, salmon, milk fortified with vitamin D.               Follow up in about 6 months (around 12/9/2025) for B12 deficiency, Vitamin D deficiency..  In addition to their scheduled follow up, the patient has also been instructed to follow up on as needed basis.       Future Appointments   Date Time Provider Department Center   12/9/2025 10:20 AM Margret Johnson FNP [facility]        [facility]

## 2025-06-10 ENCOUNTER — RESULTS FOLLOW-UP (OUTPATIENT)
Dept: FAMILY MEDICINE | Facility: CLINIC | Age: 30
End: 2025-06-10

## 2025-06-10 ENCOUNTER — TELEPHONE (OUTPATIENT)
Dept: FAMILY MEDICINE | Facility: CLINIC | Age: 30
End: 2025-06-10
Payer: MEDICAID

## 2025-06-10 DIAGNOSIS — Z3A.01 LESS THAN 8 WEEKS GESTATION OF PREGNANCY: Primary | ICD-10-CM

## 2025-06-10 NOTE — TELEPHONE ENCOUNTER
Margret Johnson, [facility]      6/10/25 10:53 AM  Note      I called patient to ask her to return to clinic  1 week for repeat quant hcg, orders in   I also told her I was ordering US to confirm gestational sac in uterus, r/o ectopic.

## 2025-06-10 NOTE — PROGRESS NOTES
I called patient to ask her to return to clinic  1 week for repeat quant hcg, orders in   I also told her I was ordering US to confirm gestational sac in uterus, r/o ectopic.

## 2025-06-11 ENCOUNTER — TELEPHONE (OUTPATIENT)
Dept: FAMILY MEDICINE | Facility: CLINIC | Age: 30
End: 2025-06-11
Payer: MEDICAID

## 2025-06-11 NOTE — TELEPHONE ENCOUNTER
Copied from CRM #[id number]. Topic: General Inquiry - Patient Advice  >> Jun 11, 2025  2:37 PM Satci wrote:  Who Called: Neha Ramirez    Caller is requesting assistance/information from provider's office.    Symptoms (please be specific): n/a     How long has patient had these symptoms:  n/a    List of preferred pharmacies on file (remove unneeded): [id number] at [address]   Phone: [phone]  Fax: [phone]    Preferred Method of Contact: Phone Call    Patient's Preferred Phone Number on File: [phone]     Best Call Back Number, if different: n/a    Additional Information:  Vitamin D and prenatal - Dr Johnson was supposed to send these 2 RX to the pharmacy, pt is f/u    Please advise pt when to pharmacy

## 2025-06-12 DIAGNOSIS — E55.9 VITAMIN D DEFICIENCY: Primary | ICD-10-CM

## 2025-06-12 RX ORDER — ASPIRIN 325 MG
50000 TABLET, DELAYED RELEASE (ENTERIC COATED) ORAL
Qty: 12 CAPSULE | Refills: 0 | Status: SHIPPED | OUTPATIENT
Start: 2025-06-12

## 2025-06-12 NOTE — PROGRESS NOTES
I have sent medications and/or lab orders in for this patient.  Please notify the patient.     No orders of the defined types were placed in this encounter.      Medications Ordered This Encounter   Medications    cholecalciferol, vitamin D3, 1,250 mcg (50,000 unit) capsule     Sig: Take 1 capsule (50,000 Units total) by mouth every 7 days.     Dispense:  12 capsule     Refill:  0    PNV,calcium 72-iron-folic acid (PRENATAL VITAMIN PLUS LOW IRON) 27 mg iron- 1 mg Tab     Sig: Take 1 tablet (1 each total) by mouth once daily.     Dispense:  30 tablet     Refill:  11

## 2025-06-16 ENCOUNTER — HOSPITAL ENCOUNTER (OUTPATIENT)
Dept: RADIOLOGY | Facility: HOSPITAL | Age: 30
Discharge: HOME | End: 2025-06-16
Attending: NURSE PRACTITIONER
Payer: MEDICAID

## 2025-06-16 ENCOUNTER — RESULTS FOLLOW-UP (OUTPATIENT)
Dept: FAMILY MEDICINE | Facility: CLINIC | Age: 30
End: 2025-06-16

## 2025-06-16 DIAGNOSIS — Z3A.01 LESS THAN 8 WEEKS GESTATION OF PREGNANCY: ICD-10-CM

## 2025-06-16 PROCEDURE — 76801 OB US < 14 WKS SINGLE FETUS: CPT | Mod: TC
